# Patient Record
Sex: FEMALE | Race: ASIAN | NOT HISPANIC OR LATINO
[De-identification: names, ages, dates, MRNs, and addresses within clinical notes are randomized per-mention and may not be internally consistent; named-entity substitution may affect disease eponyms.]

---

## 2017-09-11 ENCOUNTER — CHARTING TRANS (OUTPATIENT)
Dept: OTHER | Age: 42
End: 2017-09-11

## 2018-10-04 ENCOUNTER — CHARTING TRANS (OUTPATIENT)
Dept: OTHER | Age: 43
End: 2018-10-04

## 2018-11-02 VITALS
HEART RATE: 76 BPM | TEMPERATURE: 98.3 F | OXYGEN SATURATION: 98 % | BODY MASS INDEX: 23.5 KG/M2 | RESPIRATION RATE: 18 BRPM | WEIGHT: 119.71 LBS | HEIGHT: 60 IN

## 2018-11-27 VITALS
HEART RATE: 82 BPM | BODY MASS INDEX: 23.85 KG/M2 | WEIGHT: 121.47 LBS | HEIGHT: 60 IN | RESPIRATION RATE: 18 BRPM | TEMPERATURE: 98.4 F | OXYGEN SATURATION: 98 %

## 2019-03-30 ENCOUNTER — WALK IN (OUTPATIENT)
Dept: URGENT CARE | Age: 44
End: 2019-03-30

## 2019-03-30 VITALS
DIASTOLIC BLOOD PRESSURE: 84 MMHG | SYSTOLIC BLOOD PRESSURE: 136 MMHG | WEIGHT: 124.56 LBS | HEART RATE: 76 BPM | HEIGHT: 61 IN | RESPIRATION RATE: 14 BRPM | BODY MASS INDEX: 23.52 KG/M2

## 2019-03-30 DIAGNOSIS — Z11.1 PPD SCREENING TEST: Primary | ICD-10-CM

## 2019-03-30 DIAGNOSIS — Z02.1 PHYSICAL EXAM, PRE-EMPLOYMENT: ICD-10-CM

## 2019-03-30 PROCEDURE — 86580 TB INTRADERMAL TEST: CPT | Performed by: NURSE PRACTITIONER

## 2019-03-30 PROCEDURE — X0945 SELF PAY APN OR PA PERFORMED ADMINISTRATIVE PHYSICAL: HCPCS | Performed by: NURSE PRACTITIONER

## 2019-03-30 RX ORDER — ATORVASTATIN CALCIUM 10 MG/1
10 TABLET, FILM COATED ORAL DAILY
COMMUNITY

## 2019-03-30 ASSESSMENT — ENCOUNTER SYMPTOMS
EYE DISCHARGE: 0
VOMITING: 0
SINUS PRESSURE: 0
COUGH: 0
ABDOMINAL PAIN: 0
WHEEZING: 0
CHEST TIGHTNESS: 0
SORE THROAT: 0
SHORTNESS OF BREATH: 0
ACTIVITY CHANGE: 0
EYE PAIN: 0
FEVER: 0
DIARRHEA: 0
SINUS PAIN: 0
RHINORRHEA: 0
COLOR CHANGE: 0
CHILLS: 0
ABDOMINAL DISTENTION: 0
NAUSEA: 0
FATIGUE: 0
CONSTIPATION: 0

## 2019-04-01 ENCOUNTER — WALK IN (OUTPATIENT)
Dept: URGENT CARE | Age: 44
End: 2019-04-01

## 2019-04-01 DIAGNOSIS — Z11.1 ENCOUNTER FOR PPD SKIN TEST READING: Primary | ICD-10-CM

## 2019-04-01 LAB
INDURATION: 2 MM (ref 0–?)
SKIN TEST RESULT: NEGATIVE

## 2019-04-01 PROCEDURE — X1094 NO CHARGE VISIT: HCPCS | Performed by: NURSE PRACTITIONER

## 2021-02-12 ENCOUNTER — IMMUNIZATION (OUTPATIENT)
Dept: LAB | Age: 46
End: 2021-02-12

## 2021-02-12 DIAGNOSIS — Z23 NEED FOR VACCINATION: Primary | ICD-10-CM

## 2021-02-12 PROCEDURE — 0011A COVID-19 MODERNA VACCINE: CPT

## 2021-02-12 PROCEDURE — 91301 COVID-19 MODERNA VACCINE: CPT

## 2021-03-17 ENCOUNTER — IMMUNIZATION (OUTPATIENT)
Dept: LAB | Age: 46
End: 2021-03-17
Attending: HOSPITALIST

## 2021-03-17 DIAGNOSIS — Z23 NEED FOR VACCINATION: Primary | ICD-10-CM

## 2021-03-17 PROCEDURE — 91301 COVID-19 MODERNA VACCINE: CPT

## 2021-03-17 PROCEDURE — 0012A COVID-19 MODERNA VACCINE: CPT

## 2022-11-04 ENCOUNTER — HOSPITAL ENCOUNTER (OUTPATIENT)
Age: 47
Discharge: HOME | End: 2022-11-04
Payer: COMMERCIAL

## 2022-11-04 ENCOUNTER — APPOINTMENT (OUTPATIENT)
Dept: GENERAL RADIOLOGY | Age: 47
End: 2022-11-04
Attending: NURSE PRACTITIONER
Payer: COMMERCIAL

## 2022-11-04 VITALS
RESPIRATION RATE: 16 BRPM | HEART RATE: 78 BPM | DIASTOLIC BLOOD PRESSURE: 98 MMHG | OXYGEN SATURATION: 99 % | SYSTOLIC BLOOD PRESSURE: 162 MMHG | TEMPERATURE: 98 F

## 2022-11-04 DIAGNOSIS — S66.911A MUSCLE STRAIN OF RIGHT WRIST, INITIAL ENCOUNTER: Primary | ICD-10-CM

## 2022-11-04 PROCEDURE — 73110 X-RAY EXAM OF WRIST: CPT | Performed by: NURSE PRACTITIONER

## 2022-11-04 PROCEDURE — 99203 OFFICE O/P NEW LOW 30 MIN: CPT

## 2022-11-04 RX ORDER — METOPROLOL SUCCINATE 50 MG/1
50 TABLET, EXTENDED RELEASE ORAL DAILY
COMMUNITY
Start: 2022-10-25

## 2022-11-04 RX ORDER — IBUPROFEN 600 MG/1
600 TABLET ORAL ONCE
Status: COMPLETED | OUTPATIENT
Start: 2022-11-04 | End: 2022-11-04

## 2022-11-04 RX ORDER — ESCITALOPRAM OXALATE 10 MG/1
10 TABLET ORAL DAILY
COMMUNITY
Start: 2022-05-10

## 2022-11-04 NOTE — DISCHARGE INSTRUCTIONS
Rest, ice and elevate as much as possible over the next few days. Wear the splint as instructed. Take Tylenol and/or ibuprofen as needed for pain control. Follow up with your PCP or the orthopedic in the next 3-5 days. Thank you for choosing [facility] for your care.

## (undated) NOTE — LETTER
Date & Time: 11/4/2022, 12:08 PM  Patient: Caroline Lyon  Encounter Provider(s):    Tamara Sapp., KAMRON       To Whom It May Concern:    Jad Tay was seen and treated in our department on 11/4/2022. She should not return to work until 11/07/22 due to injury.  .    If you have any questions or concerns, please do not hesitate to call.        _____________________________  Physician/[facility] Signature